# Patient Record
Sex: FEMALE
[De-identification: names, ages, dates, MRNs, and addresses within clinical notes are randomized per-mention and may not be internally consistent; named-entity substitution may affect disease eponyms.]

---

## 2024-05-07 ENCOUNTER — NON-APPOINTMENT (OUTPATIENT)
Age: 59
End: 2024-05-07

## 2024-05-07 ENCOUNTER — APPOINTMENT (OUTPATIENT)
Dept: CARDIOLOGY | Facility: CLINIC | Age: 59
End: 2024-05-07
Payer: COMMERCIAL

## 2024-05-07 VITALS
BODY MASS INDEX: 25.34 KG/M2 | HEIGHT: 63 IN | HEART RATE: 78 BPM | WEIGHT: 143 LBS | OXYGEN SATURATION: 97 % | SYSTOLIC BLOOD PRESSURE: 140 MMHG | DIASTOLIC BLOOD PRESSURE: 100 MMHG

## 2024-05-07 PROCEDURE — 93000 ELECTROCARDIOGRAM COMPLETE: CPT

## 2024-05-07 PROCEDURE — 99204 OFFICE O/P NEW MOD 45 MIN: CPT | Mod: 25

## 2024-05-07 RX ORDER — ATORVASTATIN CALCIUM 10 MG/1
10 TABLET, FILM COATED ORAL
Refills: 0 | Status: ACTIVE | COMMUNITY

## 2024-05-07 NOTE — DISCUSSION/SUMMARY
[FreeTextEntry1] : Margareth 58yo lady with a PMH of HL; here for eval of HTN. /100 initially; 135/95 on repeat. Still hesitant about meds. BP log from home w/ numbers at goal. EKG: sinus 78bpm  -no salt diet and exercise -keep BP log -2D echo to eval LVEF and LVH -if SBPs remain elevated or LVH noted on echo... strongly encourage ACE/ARB (offered today but refused) [EKG obtained to assist in diagnosis and management of assessed problem(s)] : EKG obtained to assist in diagnosis and management of assessed problem(s)

## 2024-05-07 NOTE — HISTORY OF PRESENT ILLNESS
[FreeTextEntry1] : Margareth 58yo lady with a PMH of HL; here for eval of HTN. /100 initially; 135/95 on repeat. Still hesitant about meds. BP log from home w/ numbers at goal. EKG: sinus 78bpm

## 2024-05-30 ENCOUNTER — APPOINTMENT (OUTPATIENT)
Dept: CARDIOLOGY | Facility: CLINIC | Age: 59
End: 2024-05-30
Payer: COMMERCIAL

## 2024-05-30 PROCEDURE — 93306 TTE W/DOPPLER COMPLETE: CPT

## 2024-06-06 ENCOUNTER — APPOINTMENT (OUTPATIENT)
Dept: CARDIOLOGY | Facility: CLINIC | Age: 59
End: 2024-06-06

## 2024-06-06 VITALS — SYSTOLIC BLOOD PRESSURE: 144 MMHG | DIASTOLIC BLOOD PRESSURE: 100 MMHG

## 2024-06-06 VITALS
OXYGEN SATURATION: 98 % | SYSTOLIC BLOOD PRESSURE: 140 MMHG | BODY MASS INDEX: 25.52 KG/M2 | WEIGHT: 144 LBS | DIASTOLIC BLOOD PRESSURE: 98 MMHG | HEART RATE: 80 BPM | HEIGHT: 63 IN

## 2024-06-06 DIAGNOSIS — E78.2 MIXED HYPERLIPIDEMIA: ICD-10-CM

## 2024-06-06 DIAGNOSIS — I10 ESSENTIAL (PRIMARY) HYPERTENSION: ICD-10-CM

## 2024-06-06 PROCEDURE — G2211 COMPLEX E/M VISIT ADD ON: CPT | Mod: NC,1L

## 2024-06-06 PROCEDURE — 99214 OFFICE O/P EST MOD 30 MIN: CPT

## 2024-06-06 NOTE — PHYSICAL EXAM
[Well Developed] : well developed [Well Nourished] : well nourished [No Acute Distress] : no acute distress [No Carotid Bruit] : no carotid bruit [Normal S1, S2] : normal S1, S2 [No Murmur] : no murmur [No Rub] : no rub [No Gallop] : no gallop [Clear Lung Fields] : clear lung fields [Good Air Entry] : good air entry [No Respiratory Distress] : no respiratory distress  [Normal Gait] : normal gait [No Edema] : no edema [No Cyanosis] : no cyanosis [No Clubbing] : no clubbing [No Varicosities] : no varicosities [Moves all extremities] : moves all extremities [No Focal Deficits] : no focal deficits [Normal Speech] : normal speech [Alert and Oriented] : alert and oriented [Normal memory] : normal memory

## 2024-06-06 NOTE — HISTORY OF PRESENT ILLNESS
[FreeTextEntry1] : Margareth 58yo lady with a PMH of HL; here for eval of HTN. /100 initially; 135/95 on repeat. Still hesitant about meds. BP log from home w/ numbers at goal. EKG: sinus 78bpm  06/06/2024 58 yo f coming in today for bp check Bp log from home noted to be WNL  discussed making a f/u appt and must bring bp machine from home  pt is asymptomatic

## 2024-06-06 NOTE — CARDIOLOGY SUMMARY
[de-identified] : 1. Left ventricular cavity is normal in size. Left ventricular wall thickness is normal. Left ventricular systolic function is normal with an ejection fraction of 68 % by Jenkins's method of disks. There are no regional wall motion abnormalities seen. 2. Normal left ventricular diastolic function, with normal filling pressure. 3. Normal right ventricular cavity size, with normal wall thickness, and normal systolic function. 4. No pericardial effusion seen. 5. Trace tricuspid regurgitation. 6. Trace aortic regurgitation.

## 2024-06-06 NOTE — DISCUSSION/SUMMARY
[FreeTextEntry1] : Margareth 60yo lady with a PMH of HL; here for eval of HTN. /100 initially; 135/90 on repeat. Still hesitant about meds. BP log from home w/ numbers at goal. Pt is to bring in the bp machine during next visit in two weeks   -no salt diet and exercise -keep BP log -2D echo to eval LVEF and LVH -if SBPs remain elevated or LVH noted on echo... strongly encourage ACE/ARB (offered today but refused)

## 2024-06-20 ENCOUNTER — APPOINTMENT (OUTPATIENT)
Dept: CARDIOLOGY | Facility: CLINIC | Age: 59
End: 2024-06-20

## 2024-06-20 VITALS
SYSTOLIC BLOOD PRESSURE: 144 MMHG | WEIGHT: 140 LBS | HEIGHT: 63 IN | BODY MASS INDEX: 24.8 KG/M2 | HEART RATE: 88 BPM | DIASTOLIC BLOOD PRESSURE: 98 MMHG | OXYGEN SATURATION: 97 %

## 2024-06-20 PROCEDURE — G2211 COMPLEX E/M VISIT ADD ON: CPT | Mod: NC,1L

## 2024-06-20 PROCEDURE — 99214 OFFICE O/P EST MOD 30 MIN: CPT

## 2024-06-20 NOTE — HISTORY OF PRESENT ILLNESS
[FreeTextEntry1] : Margareth 58yo lady with a PMH of HL; here for eval of HTN. /100 initially; 135/95 on repeat. Still hesitant about meds. BP log from home w/ numbers at goal. EKG: sinus 78bpm  06/06/2024 58 yo f coming in today for bp check Bp log from home noted to be WNL  discussed making a f/u appt and must bring bp machine from home  pt is asymptomatic   06/20/2024 58yo f coming in today for BP check, provided log from home, bp log in chart, noted to be WNL In office BP: 144/98, repeat was 140/90, pt reports she has elevated bp in the office but checks at random times throughout the day at home and has noted it to be WNL  discussed with the possibility of starting medications, still hesitant about starting, has a f/u apt with her PCP and will still monitor at home considering starting the pt on remote monitoring program to have the pt evaluated asymptomatidc at the moment

## 2024-06-20 NOTE — DISCUSSION/SUMMARY
[FreeTextEntry1] : Margareth 60yo lady with a PMH of HL; here for eval of HTN. Still hesitant about meds. BP log from home w/ numbers at goal. Brought in machine from home and noted to match our reading

## 2024-06-20 NOTE — CARDIOLOGY SUMMARY
[de-identified] : 1. Left ventricular cavity is normal in size. Left ventricular wall thickness is normal. Left ventricular systolic function is normal with an ejection fraction of 68 % by Jenkins's method of disks. There are no regional wall motion abnormalities seen. 2. Normal left ventricular diastolic function, with normal filling pressure. 3. Normal right ventricular cavity size, with normal wall thickness, and normal systolic function. 4. No pericardial effusion seen. 5. Trace tricuspid regurgitation. 6. Trace aortic regurgitation.